# Patient Record
Sex: FEMALE | Race: WHITE | NOT HISPANIC OR LATINO | URBAN - METROPOLITAN AREA
[De-identification: names, ages, dates, MRNs, and addresses within clinical notes are randomized per-mention and may not be internally consistent; named-entity substitution may affect disease eponyms.]

---

## 2020-08-11 ENCOUNTER — TELEPHONE ENCOUNTER (OUTPATIENT)
Dept: URBAN - METROPOLITAN AREA CLINIC 54 | Facility: CLINIC | Age: 75
End: 2020-08-11

## 2020-09-01 ENCOUNTER — OFFICE VISIT (OUTPATIENT)
Dept: URBAN - METROPOLITAN AREA CLINIC 54 | Facility: CLINIC | Age: 75
End: 2020-09-01
Payer: MEDICARE

## 2020-09-01 DIAGNOSIS — K59.4 LEVATOR SYNDROME: ICD-10-CM

## 2020-09-01 DIAGNOSIS — K62.89 ANAL BURNING: ICD-10-CM

## 2020-09-01 PROCEDURE — 99203 OFFICE O/P NEW LOW 30 MIN: CPT | Performed by: INTERNAL MEDICINE

## 2020-09-01 PROCEDURE — 25 SEPARATE E/M: Performed by: INTERNAL MEDICINE

## 2020-09-01 RX ORDER — LEVOTHYROXINE SODIUM 75 UG/1
TAKE 1 TABLET (75 MCG) BY ORAL ROUTE ONCE DAILY TABLET ORAL 1
Qty: 0 | Refills: 0 | Status: ACTIVE | COMMUNITY
Start: 1900-01-01

## 2020-09-01 RX ORDER — ACETAMINOPHEN 325 MG/1
TABLET, FILM COATED ORAL
Qty: 0 | Refills: 0 | Status: ACTIVE | COMMUNITY
Start: 1900-01-01

## 2020-09-01 RX ORDER — CETIRIZINE HYDROCHLORIDE 10 MG/1
TAKE 1 TABLET (10 MG) BY ORAL ROUTE ONCE DAILY TABLET, FILM COATED ORAL 1
Qty: 0 | Refills: 0 | Status: ACTIVE | COMMUNITY
Start: 1900-01-01

## 2020-09-01 RX ORDER — ENALAPRIL MALEATE 5 MG/1
TAKE 1 TABLET (5 MG) BY ORAL ROUTE ONCE DAILY TABLET ORAL 1
Qty: 0 | Refills: 0 | Status: ACTIVE | COMMUNITY
Start: 1900-01-01

## 2020-09-01 RX ORDER — METFORMIN HYDROCHLORIDE 500 MG/1
TAKE 1 TABLET IN AM AND 2 TABLETS IN PM TABLET, COATED ORAL 2
Qty: 0 | Refills: 0 | Status: ACTIVE | COMMUNITY
Start: 1900-01-01

## 2020-09-01 RX ORDER — HYDROXYCHLOROQUINE SULFATE 200 MG/1
TAKE 1 TABLET (200 MG) BY ORAL ROUTE THREE TIMES PER WEEK TABLET ORAL 1
Qty: 0 | Refills: 0 | Status: ACTIVE | COMMUNITY
Start: 1900-01-01

## 2020-09-01 RX ORDER — AMITRIPTYLINE HYDROCHLORIDE 10 MG/1
1 TABLET AT BEDTIME TABLET, FILM COATED ORAL ONCE A DAY
Qty: 30 TABLET | Refills: 6 | OUTPATIENT

## 2020-09-01 NOTE — EXAM-PHYSICAL EXAM
chaperone offered onrectal: placed in ll position. perianal scarring and erythema noted small sq nodule at 1 oclock. +sentinel tag

## 2020-09-01 NOTE — HPI-TODAY'S VISIT:
Pt presents with c.o anal discomfort and spasm. States intermittent for the past 2 years. Hx of levator ani syndrome. Pt reports that sitting makes symptoms worse. No hematochezia. No weight loss.

## 2020-09-10 ENCOUNTER — TELEPHONE ENCOUNTER (OUTPATIENT)
Dept: URBAN - METROPOLITAN AREA CLINIC 54 | Facility: CLINIC | Age: 75
End: 2020-09-10

## 2020-09-14 ENCOUNTER — OFFICE VISIT (OUTPATIENT)
Dept: URBAN - METROPOLITAN AREA CLINIC 54 | Facility: CLINIC | Age: 75
End: 2020-09-14
Payer: MEDICARE

## 2020-09-14 DIAGNOSIS — K63.5 COLON POLYPS: ICD-10-CM

## 2020-09-14 DIAGNOSIS — K59.4 LEVATOR SYNDROME: ICD-10-CM

## 2020-09-14 PROCEDURE — G9903 PT SCRN TBCO ID AS NON USER: HCPCS | Performed by: INTERNAL MEDICINE

## 2020-09-14 PROCEDURE — 99214 OFFICE O/P EST MOD 30 MIN: CPT | Performed by: INTERNAL MEDICINE

## 2020-09-14 PROCEDURE — 1036F TOBACCO NON-USER: CPT | Performed by: INTERNAL MEDICINE

## 2020-09-14 PROCEDURE — 3017F COLORECTAL CA SCREEN DOC REV: CPT | Performed by: INTERNAL MEDICINE

## 2020-09-14 PROCEDURE — G8427 DOCREV CUR MEDS BY ELIG CLIN: HCPCS | Performed by: INTERNAL MEDICINE

## 2020-09-14 PROCEDURE — G8417 CALC BMI ABV UP PARAM F/U: HCPCS | Performed by: INTERNAL MEDICINE

## 2020-09-14 RX ORDER — ENALAPRIL MALEATE 5 MG/1
TAKE 1 TABLET (5 MG) BY ORAL ROUTE ONCE DAILY TABLET ORAL 1
Qty: 0 | Refills: 0 | Status: ACTIVE | COMMUNITY
Start: 1900-01-01

## 2020-09-14 RX ORDER — ACETAMINOPHEN 325 MG/1
TABLET, FILM COATED ORAL
Qty: 0 | Refills: 0 | Status: ACTIVE | COMMUNITY
Start: 1900-01-01

## 2020-09-14 RX ORDER — LEVOTHYROXINE SODIUM 75 UG/1
TAKE 1 TABLET (75 MCG) BY ORAL ROUTE ONCE DAILY TABLET ORAL 1
Qty: 0 | Refills: 0 | Status: ACTIVE | COMMUNITY
Start: 1900-01-01

## 2020-09-14 RX ORDER — AMITRIPTYLINE HYDROCHLORIDE 10 MG/1
1 TABLET AT BEDTIME TABLET, FILM COATED ORAL ONCE A DAY
Qty: 30 TABLET | Refills: 6 | Status: ACTIVE | COMMUNITY

## 2020-09-14 RX ORDER — METFORMIN HYDROCHLORIDE 500 MG/1
TAKE 1 TABLET IN AM AND 2 TABLETS IN PM TABLET, COATED ORAL 2
Qty: 0 | Refills: 0 | Status: ACTIVE | COMMUNITY
Start: 1900-01-01

## 2020-09-14 RX ORDER — HYDROXYCHLOROQUINE SULFATE 200 MG/1
TAKE 1 TABLET (200 MG) BY ORAL ROUTE THREE TIMES PER WEEK TABLET ORAL 1
Qty: 0 | Refills: 0 | Status: ACTIVE | COMMUNITY
Start: 1900-01-01

## 2020-09-14 RX ORDER — CETIRIZINE HYDROCHLORIDE 10 MG/1
TAKE 1 TABLET (10 MG) BY ORAL ROUTE ONCE DAILY TABLET, FILM COATED ORAL 1
Qty: 0 | Refills: 0 | Status: ACTIVE | COMMUNITY
Start: 1900-01-01

## 2020-09-14 NOTE — HPI-TODAY'S VISIT:
Pt reports that she can sit better and longer.  Pt reports that she has some medication questions.  Pt reports that she is using a q tup to insert.  Pt reports that she uses a pea size and it is working well.  Pt is taking elavil 10 mg qhs which has helped.  Pt reports that she is taking 5 mg xanax at night. Pt reports that she notices shaky hands.  Pt reports that she is unsure if r/t

## 2020-10-13 ENCOUNTER — OFFICE VISIT (OUTPATIENT)
Dept: URBAN - METROPOLITAN AREA CLINIC 54 | Facility: CLINIC | Age: 75
End: 2020-10-13

## 2020-11-09 ENCOUNTER — WEB ENCOUNTER (OUTPATIENT)
Dept: URBAN - METROPOLITAN AREA CLINIC 54 | Facility: CLINIC | Age: 75
End: 2020-11-09

## 2020-11-09 ENCOUNTER — TELEPHONE ENCOUNTER (OUTPATIENT)
Dept: URBAN - METROPOLITAN AREA CLINIC 54 | Facility: CLINIC | Age: 75
End: 2020-11-09

## 2020-11-09 ENCOUNTER — OFFICE VISIT (OUTPATIENT)
Dept: URBAN - METROPOLITAN AREA CLINIC 54 | Facility: CLINIC | Age: 75
End: 2020-11-09
Payer: MEDICARE

## 2020-11-09 DIAGNOSIS — K59.4 LEVATOR SYNDROME: ICD-10-CM

## 2020-11-09 PROCEDURE — 99213 OFFICE O/P EST LOW 20 MIN: CPT | Performed by: INTERNAL MEDICINE

## 2020-11-09 RX ORDER — LIDOCAINE 50 MG/G
1 APPLICATION AS NEEDED OINTMENT TOPICAL THREE TIMES A DAY
Qty: 1 BOTTLE | Refills: 1 | OUTPATIENT

## 2020-11-09 RX ORDER — CETIRIZINE HYDROCHLORIDE 10 MG/1
TAKE 1 TABLET (10 MG) BY ORAL ROUTE ONCE DAILY TABLET, FILM COATED ORAL 1
Qty: 0 | Refills: 0 | Status: ACTIVE | COMMUNITY
Start: 1900-01-01

## 2020-11-09 RX ORDER — AMITRIPTYLINE HYDROCHLORIDE 10 MG/1
1 TABLET AT BEDTIME TABLET, FILM COATED ORAL ONCE A DAY
Qty: 30 TABLET | Refills: 6 | Status: ACTIVE | COMMUNITY

## 2020-11-09 RX ORDER — METFORMIN HYDROCHLORIDE 500 MG/1
TAKE 1 TABLET IN AM AND 2 TABLETS IN PM TABLET, COATED ORAL 2
Qty: 0 | Refills: 0 | Status: ACTIVE | COMMUNITY
Start: 1900-01-01

## 2020-11-09 RX ORDER — LEVOTHYROXINE SODIUM 75 UG/1
TAKE 1 TABLET (75 MCG) BY ORAL ROUTE ONCE DAILY TABLET ORAL 1
Qty: 0 | Refills: 0 | Status: ACTIVE | COMMUNITY
Start: 1900-01-01

## 2020-11-09 RX ORDER — ACETAMINOPHEN 325 MG/1
TABLET, FILM COATED ORAL
Qty: 0 | Refills: 0 | Status: ACTIVE | COMMUNITY
Start: 1900-01-01

## 2020-11-09 RX ORDER — ENALAPRIL MALEATE 5 MG/1
TAKE 1 TABLET (5 MG) BY ORAL ROUTE ONCE DAILY TABLET ORAL 1
Qty: 0 | Refills: 0 | Status: ACTIVE | COMMUNITY
Start: 1900-01-01

## 2020-11-09 RX ORDER — HYDROXYCHLOROQUINE SULFATE 200 MG/1
TAKE 1 TABLET (200 MG) BY ORAL ROUTE THREE TIMES PER WEEK TABLET ORAL 1
Qty: 0 | Refills: 0 | Status: ACTIVE | COMMUNITY
Start: 1900-01-01

## 2020-11-09 NOTE — HPI-TODAY'S VISIT:
Pt reports that she has had the toruble from the sitting. Pt reports that she stopped the ointment.  Pt reports that the only difference that she sees she continues to blow out air.  Pt reports that she will blow out air.  Pt reports that when she has this to occur.  Pt reports that she has pain, tender, and soreness. Pt reports that she feels the same way even when she used the ointment but she had more burning.

## 2020-12-01 ENCOUNTER — TELEPHONE ENCOUNTER (OUTPATIENT)
Dept: URBAN - METROPOLITAN AREA CLINIC 54 | Facility: CLINIC | Age: 75
End: 2020-12-01

## 2021-01-06 ENCOUNTER — OFFICE VISIT (OUTPATIENT)
Dept: URBAN - METROPOLITAN AREA CLINIC 95 | Facility: CLINIC | Age: 76
End: 2021-01-06

## 2021-01-13 ENCOUNTER — OFFICE VISIT (OUTPATIENT)
Dept: URBAN - METROPOLITAN AREA CLINIC 95 | Facility: CLINIC | Age: 76
End: 2021-01-13

## 2021-01-20 ENCOUNTER — OFFICE VISIT (OUTPATIENT)
Dept: URBAN - METROPOLITAN AREA CLINIC 95 | Facility: CLINIC | Age: 76
End: 2021-01-20

## 2021-01-27 ENCOUNTER — OFFICE VISIT (OUTPATIENT)
Dept: URBAN - METROPOLITAN AREA CLINIC 95 | Facility: CLINIC | Age: 76
End: 2021-01-27

## 2021-07-15 ENCOUNTER — ERX REFILL RESPONSE (OUTPATIENT)
Dept: URBAN - METROPOLITAN AREA CLINIC 54 | Facility: CLINIC | Age: 76
End: 2021-07-15

## 2021-07-15 RX ORDER — AMITRIPTYLINE HYDROCHLORIDE 10 MG/1
TAKE 1 TABLET BY MOUTH ONCE DAILY AT BEDTIME FOR 30 DAYS TABLET, FILM COATED ORAL
Qty: 30 TABLET | Refills: 1 | OUTPATIENT

## 2021-07-15 RX ORDER — AMITRIPTYLINE HYDROCHLORIDE 10 MG/1
1 TABLET AT BEDTIME TABLET, FILM COATED ORAL ONCE A DAY
Qty: 30 TABLET | Refills: 6 | OUTPATIENT

## 2022-06-06 ENCOUNTER — WEB ENCOUNTER (OUTPATIENT)
Dept: URBAN - METROPOLITAN AREA CLINIC 54 | Facility: CLINIC | Age: 77
End: 2022-06-06

## 2022-06-06 ENCOUNTER — DASHBOARD ENCOUNTERS (OUTPATIENT)
Age: 77
End: 2022-06-06

## 2022-06-06 ENCOUNTER — OFFICE VISIT (OUTPATIENT)
Dept: URBAN - METROPOLITAN AREA CLINIC 54 | Facility: CLINIC | Age: 77
End: 2022-06-06
Payer: MEDICARE

## 2022-06-06 VITALS
SYSTOLIC BLOOD PRESSURE: 143 MMHG | DIASTOLIC BLOOD PRESSURE: 66 MMHG | WEIGHT: 159 LBS | BODY MASS INDEX: 28.17 KG/M2 | TEMPERATURE: 98.2 F | HEART RATE: 77 BPM | HEIGHT: 63 IN

## 2022-06-06 DIAGNOSIS — Z86.010 PERSONAL HISTORY OF COLONIC POLYPS: ICD-10-CM

## 2022-06-06 DIAGNOSIS — K60.3 PERIANAL FISTULA: ICD-10-CM

## 2022-06-06 DIAGNOSIS — M62.89 PELVIC FLOOR TENSION: ICD-10-CM

## 2022-06-06 PROCEDURE — 99242 OFF/OP CONSLTJ NEW/EST SF 20: CPT | Performed by: INTERNAL MEDICINE

## 2022-06-06 PROCEDURE — 99212 OFFICE O/P EST SF 10 MIN: CPT | Performed by: INTERNAL MEDICINE

## 2022-06-06 RX ORDER — LEVOTHYROXINE SODIUM 75 UG/1
TAKE 1 TABLET (75 MCG) BY ORAL ROUTE ONCE DAILY TABLET ORAL 1
Qty: 0 | Refills: 0 | Status: ACTIVE | COMMUNITY
Start: 1900-01-01

## 2022-06-06 RX ORDER — HYDROXYCHLOROQUINE SULFATE 200 MG/1
TAKE 1 TABLET (200 MG) BY ORAL ROUTE THREE TIMES PER WEEK TABLET ORAL 1
Qty: 0 | Refills: 0 | Status: ACTIVE | COMMUNITY
Start: 1900-01-01

## 2022-06-06 RX ORDER — SODIUM SULFATE, MAGNESIUM SULFATE, AND POTASSIUM CHLORIDE 17.75; 2.7; 2.25 G/1; G/1; G/1
12 TABLETS TABLET ORAL
Qty: 24 TABLETS | Refills: 0 | OUTPATIENT
Start: 2022-06-06 | End: 2022-06-07

## 2022-06-06 RX ORDER — ACETAMINOPHEN 325 MG/1
TABLET, FILM COATED ORAL
Qty: 0 | Refills: 0 | Status: ACTIVE | COMMUNITY
Start: 1900-01-01

## 2022-06-06 RX ORDER — GLIPIZIDE 2.5 MG/1
1 TABLET WITH BREAKFAST TABLET, FILM COATED, EXTENDED RELEASE ORAL ONCE A DAY
Status: ACTIVE | COMMUNITY

## 2022-06-06 RX ORDER — CETIRIZINE HYDROCHLORIDE 10 MG/1
TAKE 1 TABLET (10 MG) BY ORAL ROUTE ONCE DAILY TABLET, FILM COATED ORAL 1
Qty: 0 | Refills: 0 | Status: ACTIVE | COMMUNITY
Start: 1900-01-01

## 2022-06-06 NOTE — HPI-TODAY'S VISIT:
Patient is a 77 year old woman referred by Dr. Al Dickinson for above reason. A copy of this document will be sent to referring provider. Her last exam was in 2013 when a small TA and hyperplastic polyps were removed. No family history of CRC. She has no GI complaints .  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 She denies cardiopulmonary disease, intake of blood thinners or problems with anesthesia. Recent blood work normal.

## 2022-06-10 PROBLEM — 428283002: Status: ACTIVE | Noted: 2022-06-06

## 2022-06-23 ENCOUNTER — OFFICE VISIT (OUTPATIENT)
Dept: URBAN - METROPOLITAN AREA SURGERY CENTER 14 | Facility: SURGERY CENTER | Age: 77
End: 2022-06-23

## 2022-06-30 ENCOUNTER — OFFICE VISIT (OUTPATIENT)
Dept: URBAN - METROPOLITAN AREA SURGERY CENTER 14 | Facility: SURGERY CENTER | Age: 77
End: 2022-06-30
Payer: MEDICARE

## 2022-06-30 ENCOUNTER — TELEPHONE ENCOUNTER (OUTPATIENT)
Dept: URBAN - METROPOLITAN AREA CLINIC 54 | Facility: CLINIC | Age: 77
End: 2022-06-30

## 2022-06-30 ENCOUNTER — CLAIMS CREATED FROM THE CLAIM WINDOW (OUTPATIENT)
Dept: URBAN - METROPOLITAN AREA CLINIC 4 | Facility: CLINIC | Age: 77
End: 2022-06-30
Payer: MEDICARE

## 2022-06-30 DIAGNOSIS — D12.2 ADENOMA OF ASCENDING COLON: ICD-10-CM

## 2022-06-30 DIAGNOSIS — D12.3 ADENOMA OF TRANSVERSE COLON: ICD-10-CM

## 2022-06-30 DIAGNOSIS — Z12.11 COLON CANCER SCREENING: ICD-10-CM

## 2022-06-30 DIAGNOSIS — D12.3 BENIGN NEOPLASM OF TRANSVERSE COLON: ICD-10-CM

## 2022-06-30 DIAGNOSIS — D12.2 BENIGN NEOPLASM OF ASCENDING COLON: ICD-10-CM

## 2022-06-30 PROCEDURE — G8907 PT DOC NO EVENTS ON DISCHARG: HCPCS | Performed by: INTERNAL MEDICINE

## 2022-06-30 PROCEDURE — 45380 COLONOSCOPY AND BIOPSY: CPT | Performed by: INTERNAL MEDICINE

## 2022-06-30 PROCEDURE — 88305 TISSUE EXAM BY PATHOLOGIST: CPT | Performed by: PATHOLOGY

## 2024-11-20 ENCOUNTER — APPOINTMENT (RX ONLY)
Dept: URBAN - NONMETROPOLITAN AREA OTHER 3 | Facility: OTHER | Age: 79
Setting detail: DERMATOLOGY
End: 2024-11-20

## 2024-11-20 DIAGNOSIS — D18.0 HEMANGIOMA: ICD-10-CM

## 2024-11-20 DIAGNOSIS — L82.0 INFLAMED SEBORRHEIC KERATOSIS: ICD-10-CM

## 2024-11-20 PROBLEM — D18.01 HEMANGIOMA OF SKIN AND SUBCUTANEOUS TISSUE: Status: ACTIVE | Noted: 2024-11-20

## 2024-11-20 PROCEDURE — ? COUNSELING

## 2024-11-20 PROCEDURE — ? LIQUID NITROGEN

## 2024-11-20 PROCEDURE — 17110 DESTRUCTION B9 LES UP TO 14: CPT

## 2024-11-20 PROCEDURE — 99202 OFFICE O/P NEW SF 15 MIN: CPT | Mod: 25

## 2024-11-20 ASSESSMENT — LOCATION DETAILED DESCRIPTION DERM
LOCATION DETAILED: LEFT INFERIOR LATERAL NECK
LOCATION DETAILED: RIGHT SUPERIOR MEDIAL MIDBACK
LOCATION DETAILED: RIGHT INFERIOR MEDIAL LOWER BACK

## 2024-11-20 ASSESSMENT — LOCATION SIMPLE DESCRIPTION DERM
LOCATION SIMPLE: RIGHT LOWER BACK
LOCATION SIMPLE: LEFT ANTERIOR NECK

## 2024-11-20 ASSESSMENT — LOCATION ZONE DERM
LOCATION ZONE: NECK
LOCATION ZONE: TRUNK